# Patient Record
Sex: FEMALE | Race: WHITE | NOT HISPANIC OR LATINO | Employment: PART TIME | URBAN - METROPOLITAN AREA
[De-identification: names, ages, dates, MRNs, and addresses within clinical notes are randomized per-mention and may not be internally consistent; named-entity substitution may affect disease eponyms.]

---

## 2017-05-09 ENCOUNTER — OFFICE VISIT (OUTPATIENT)
Dept: FAMILY MEDICINE | Facility: CLINIC | Age: 27
End: 2017-05-09
Attending: FAMILY MEDICINE
Payer: COMMERCIAL

## 2017-05-09 VITALS
SYSTOLIC BLOOD PRESSURE: 106 MMHG | DIASTOLIC BLOOD PRESSURE: 62 MMHG | BODY MASS INDEX: 36.39 KG/M2 | HEIGHT: 65 IN | HEART RATE: 66 BPM | WEIGHT: 218.38 LBS | OXYGEN SATURATION: 98 %

## 2017-05-09 DIAGNOSIS — R10.30 LOWER ABDOMINAL PAIN: Primary | ICD-10-CM

## 2017-05-09 DIAGNOSIS — N31.8 FREQUENCY-URGENCY SYNDROME: ICD-10-CM

## 2017-05-09 DIAGNOSIS — R19.5 ABNORMAL STOOLS: ICD-10-CM

## 2017-05-09 LAB
BACTERIA #/AREA URNS AUTO: NORMAL /HPF
BILIRUB UR QL STRIP: NEGATIVE
CLARITY UR REFRACT.AUTO: CLEAR
COLOR UR AUTO: ABNORMAL
GLUCOSE UR QL STRIP: NEGATIVE
HGB UR QL STRIP: NEGATIVE
KETONES UR QL STRIP: NEGATIVE
LEUKOCYTE ESTERASE UR QL STRIP: ABNORMAL
MICROSCOPIC COMMENT: NORMAL
NITRITE UR QL STRIP: NEGATIVE
PH UR STRIP: 7 [PH] (ref 5–8)
PROT UR QL STRIP: NEGATIVE
RBC #/AREA URNS AUTO: 1 /HPF (ref 0–4)
SP GR UR STRIP: 1 (ref 1–1.03)
SQUAMOUS #/AREA URNS AUTO: 1 /HPF
URN SPEC COLLECT METH UR: ABNORMAL
UROBILINOGEN UR STRIP-ACNC: NEGATIVE EU/DL
WBC #/AREA URNS AUTO: 0 /HPF (ref 0–5)

## 2017-05-09 PROCEDURE — 99999 PR PBB SHADOW E&M-EST. PATIENT-LVL III: CPT | Mod: PBBFAC,,, | Performed by: FAMILY MEDICINE

## 2017-05-09 PROCEDURE — 99214 OFFICE O/P EST MOD 30 MIN: CPT | Mod: S$GLB,,, | Performed by: FAMILY MEDICINE

## 2017-05-09 PROCEDURE — 81001 URINALYSIS AUTO W/SCOPE: CPT

## 2017-05-09 PROCEDURE — 1160F RVW MEDS BY RX/DR IN RCRD: CPT | Mod: S$GLB,,, | Performed by: FAMILY MEDICINE

## 2017-05-09 NOTE — MR AVS SNAPSHOT
[address], [address]  [facility] [address]  Phone: [phone]                  Chelsie Jasso   2017 11:20 AM   Office Visit    Description:  Female : 1990   Provider:  Hunter Arenas Jr., MD   Department:  [facility]           Reason for Visit     Abdominal Pain           Diagnoses this Visit        Comments    Lower abdominal pain    -  Primary     Abnormal stools         Frequency-urgency syndrome                To Do List           Future Appointments        Provider Department Dept Phone    5/15/2017 8:30 AM LAB, [facility] [phone]    5/15/2017 9:15 AM [facility] XR1 300 LB LIMIT [facility] [phone]      Goals (5 Years of Data)     None      [facility]     [facility] Care Line -  Assistance  Unless otherwise directed by your provider, please contact [facility], our nurse care line that is available for  assistance.     Registered nurses in the [facility] provide: appointment scheduling, clinical advisement, health education, and other advisory services.  Call: [phone] (toll free)               Medications           Message regarding Medications     Verify the changes and/or additions to your medication regime listed below are the same as discussed with your clinician today.  If any of these changes or additions are incorrect, please notify your healthcare provider.             Verify that the below list of medications is an accurate representation of the medications you are currently taking.  If none reported, the list may be blank. If incorrect, please contact your healthcare provider. Carry this list with you in case of emergency.                Clinical Reference Information           Your Vitals Were     BP Pulse Height Weight Last Period SpO2    106/62 (BP Location: Left arm, Patient Position: Sitting, BP Method:  "Manual) 66 5' 5" (1.651 m) 99.1 kg (218 lb 6.4 oz) 04/09/2017 (Approximate) 98%    BMI                36.34 kg/m2          Blood Pressure          Most Recent Value    BP  106/62      Allergies as of 5/9/2017     No Known Allergies      Immunizations Administered on Date of Encounter - 5/9/2017     None      Orders Placed During Today's Visit      Normal Orders This Visit    Urinalysis     Future Labs/Procedures Expected by Expires    CBC auto differential  5/9/2017 5/10/2018    Comprehensive metabolic panel  5/9/2017 5/10/2018    Lipoprotein Analysis, by NMR  5/9/2017 7/8/2018    Sedimentation rate, manual  5/9/2017 5/9/2018    T4, free  5/9/2017 5/9/2018    TSH  5/9/2017 5/9/2018    X-Ray Abdomen Flat And Erect  5/9/2017 5/9/2018      Language Assistance Services     ATTENTION: Language assistance services are available, free of charge. Please call [phone].      ATENCIÓN: Si habla lucrecia, tiene a olsen disposición servicios gratuitos de asistencia lingüística. Llame al [phone].     CHÚ Ý: N?u b?n nói Ti?ng Vi?t, có các d?ch v? h? tr? ngôn ng? mi?n phí dành cho b?n. G?i s? [phone].         [facility] complies with applicable Federal civil rights laws and does not discriminate on the basis of race, color, national origin, age, disability, or sex.        "

## 2017-05-09 NOTE — PROGRESS NOTES
Subjective:       Patient ID: Chelsie Jasso is a 26 y.o. female.    Chief Complaint: Abdominal Pain    Abdominal Pain   This is a new problem. The current episode started 1 to 4 weeks ago. The onset quality is sudden. The problem occurs 2 to 4 times per day. The problem has been gradually worsening. The pain is located in the suprapubic region. The pain is at a severity of 6/10. The pain is moderate. The quality of the pain is sharp. The abdominal pain does not radiate. Associated symptoms include diarrhea (2-4 soft/semiformed stools/day), dysuria, flatus and frequency. Pertinent negatives include no anorexia, belching, constipation, fever, headaches, hematochezia, hematuria, melena, nausea or vomiting. Nothing aggravates the pain. The pain is relieved by nothing. She has tried nothing for the symptoms. There is no history of Crohn's disease, gallstones, irritable bowel syndrome, pancreatitis or ulcerative colitis.       There is no problem list on file for this patient.    No current outpatient prescriptions on file.    The following portions of the patient's history were reviewed and updated as appropriate: allergies, past family history, past medical history, past social history and past surgical history.    Review of Systems   Constitutional: Negative for fever.   Gastrointestinal: Positive for abdominal pain, diarrhea (2-4 soft/semiformed stools/day) and flatus. Negative for anorexia, constipation, hematochezia, melena, nausea and vomiting.   Genitourinary: Positive for dysuria and frequency. Negative for hematuria.   Neurological: Negative for headaches.       Objective:      Physical Exam   Constitutional: She is oriented to person, place, and time. She appears well-developed and well-nourished. She is cooperative.   HENT:   Head: Normocephalic and atraumatic.   Nose: Nose normal.   Mouth/Throat: Oropharynx is clear and moist and mucous membranes are normal.   Eyes: Conjunctivae are normal. No scleral  "icterus.   Neck: Neck supple. No JVD present. Carotid bruit is not present. No thyromegaly present.   Cardiovascular: Normal rate, regular rhythm, normal heart sounds and normal pulses.  Exam reveals no gallop and no friction rub.    No murmur heard.  Pulmonary/Chest: Effort normal and breath sounds normal. She has no wheezes. She has no rhonchi. She has no rales.   Abdominal: Soft. She exhibits no distension and no mass. Bowel sounds are decreased. There is no splenomegaly or hepatomegaly. There is tenderness in the right lower quadrant, suprapubic area and left lower quadrant. There is no rigidity, no guarding and no tenderness at McBurney's point.   Musculoskeletal: Normal range of motion. She exhibits no edema or tenderness.   Lymphadenopathy:     She has no cervical adenopathy.     She has no axillary adenopathy.   Neurological: She is alert and oriented to person, place, and time. She has normal strength and normal reflexes. No cranial nerve deficit or sensory deficit.   Skin: Skin is warm and dry.   Psychiatric: She has a normal mood and affect. Her speech is normal.   Vitals reviewed.        Assessment:       1. Lower abdominal pain    2. Abnormal stools    3. Frequency-urgency syndrome        IBS?    Plan:       Discussed findings with patient.    Orders Placed This Encounter    X-Ray Abdomen Flat And Erect    CBC auto differential    Comprehensive metabolic panel    TSH    T4, free    Lipoprotein Analysis, by NMR    Sedimentation rate, manual    Urinalysis       We will call the patient with results & make further recommendations at that time.      "This note will not be shared with the patient."  "

## 2017-05-15 ENCOUNTER — HOSPITAL ENCOUNTER (OUTPATIENT)
Dept: RADIOLOGY | Facility: HOSPITAL | Age: 27
Discharge: HOME | End: 2017-05-15
Attending: FAMILY MEDICINE
Payer: COMMERCIAL

## 2017-05-15 DIAGNOSIS — R10.30 LOWER ABDOMINAL PAIN: ICD-10-CM

## 2017-05-15 DIAGNOSIS — R19.5 ABNORMAL STOOLS: ICD-10-CM

## 2017-05-15 PROCEDURE — 74020 XR ABDOMEN FLAT AND ERECT: CPT | Mod: 26,,, | Performed by: RADIOLOGY

## 2017-05-15 PROCEDURE — 74020 XR ABDOMEN FLAT AND ERECT: CPT | Mod: TC,PO

## 2017-05-19 ENCOUNTER — PATIENT MESSAGE (OUTPATIENT)
Dept: FAMILY MEDICINE | Facility: CLINIC | Age: 27
End: 2017-05-19

## 2017-05-19 DIAGNOSIS — K58.0 IRRITABLE BOWEL SYNDROME WITH DIARRHEA: ICD-10-CM

## 2017-05-21 ENCOUNTER — PATIENT MESSAGE (OUTPATIENT)
Dept: FAMILY MEDICINE | Facility: CLINIC | Age: 27
End: 2017-05-21

## 2017-05-21 PROBLEM — K58.0 IRRITABLE BOWEL SYNDROME WITH DIARRHEA: Status: ACTIVE | Noted: 2017-05-21

## 2017-05-21 RX ORDER — HYOSCYAMINE SULFATE 0.12 MG/1
0.12 TABLET SUBLINGUAL EVERY 4 HOURS PRN
Qty: 20 TABLET | Refills: 0 | Status: SHIPPED | OUTPATIENT
Start: 2017-05-21